# Patient Record
Sex: MALE | Race: WHITE | NOT HISPANIC OR LATINO | Employment: UNEMPLOYED | URBAN - METROPOLITAN AREA
[De-identification: names, ages, dates, MRNs, and addresses within clinical notes are randomized per-mention and may not be internally consistent; named-entity substitution may affect disease eponyms.]

---

## 2019-01-31 ENCOUNTER — TELEPHONE (OUTPATIENT)
Dept: SCHEDULING | Facility: IMAGING CENTER | Age: 34
End: 2019-01-31

## 2019-02-03 ENCOUNTER — OFFICE VISIT (OUTPATIENT)
Dept: URGENT CARE | Facility: PHYSICIAN GROUP | Age: 34
End: 2019-02-03
Payer: COMMERCIAL

## 2019-02-03 VITALS
DIASTOLIC BLOOD PRESSURE: 70 MMHG | HEART RATE: 76 BPM | TEMPERATURE: 99.4 F | RESPIRATION RATE: 16 BRPM | BODY MASS INDEX: 26.34 KG/M2 | SYSTOLIC BLOOD PRESSURE: 106 MMHG | WEIGHT: 184 LBS | HEIGHT: 70 IN | OXYGEN SATURATION: 99 %

## 2019-02-03 DIAGNOSIS — Z76.0 MEDICATION REFILL: ICD-10-CM

## 2019-02-03 DIAGNOSIS — F32.A DEPRESSION, UNSPECIFIED DEPRESSION TYPE: ICD-10-CM

## 2019-02-03 PROCEDURE — 99203 OFFICE O/P NEW LOW 30 MIN: CPT | Performed by: PHYSICIAN ASSISTANT

## 2019-02-03 RX ORDER — ESCITALOPRAM OXALATE 10 MG/1
10 TABLET ORAL DAILY
Qty: 30 TAB | Refills: 0 | Status: SHIPPED | OUTPATIENT
Start: 2019-02-03 | End: 2019-03-05

## 2019-02-03 RX ORDER — ESCITALOPRAM OXALATE 10 MG/1
10 TABLET ORAL DAILY
COMMUNITY
End: 2019-02-03 | Stop reason: SDUPTHER

## 2019-02-03 ASSESSMENT — ENCOUNTER SYMPTOMS
SENSORY CHANGE: 0
HALLUCINATIONS: 0
CHILLS: 0
HEADACHES: 0
COUGH: 0
PHOTOPHOBIA: 0
VOMITING: 0
INSOMNIA: 1
NAUSEA: 0
SHORTNESS OF BREATH: 0
EYE PAIN: 0
DEPRESSION: 1
DIZZINESS: 0
BLURRED VISION: 0
MEMORY LOSS: 0
LOSS OF CONSCIOUSNESS: 0
NECK PAIN: 0
FOCAL WEAKNESS: 0
SEIZURES: 0
FEVER: 0
DOUBLE VISION: 0
PALPITATIONS: 0
NERVOUS/ANXIOUS: 1

## 2019-02-03 ASSESSMENT — LIFESTYLE VARIABLES: SUBSTANCE_ABUSE: 0

## 2019-02-03 NOTE — PROGRESS NOTES
"Subjective:      Anil Kendall is a 33 y.o. male who presents with Medication Refill (refill on lexapro, need referral for behavorial health)            Depression   This is a chronic problem. The current episode started more than 1 year ago. The problem occurs constantly. Pertinent negatives include no chest pain, chills, coughing, fever, headaches, nausea, neck pain or vomiting. Treatments tried: SSRI. The treatment provided significant relief.       Review of Systems   Constitutional: Negative for chills and fever.   HENT: Negative for ear pain, hearing loss and tinnitus.    Eyes: Negative for blurred vision, double vision, photophobia and pain.   Respiratory: Negative for cough and shortness of breath.    Cardiovascular: Negative for chest pain and palpitations.   Gastrointestinal: Negative for nausea and vomiting.   Musculoskeletal: Negative for neck pain.   Neurological: Negative for dizziness, sensory change, focal weakness, seizures, loss of consciousness and headaches.   Psychiatric/Behavioral: Positive for depression. Negative for hallucinations, memory loss, substance abuse and suicidal ideas. The patient is nervous/anxious and has insomnia.    All other systems reviewed and are negative.    PMH:  has no past medical history on file.  MEDS:   Current Outpatient Prescriptions:   •  escitalopram (LEXAPRO) 10 MG Tab, Take 1 Tab by mouth every day for 30 days., Disp: 30 Tab, Rfl: 0  ALLERGIES:   Allergies   Allergen Reactions   • Augmentin Swelling     SURGHX: No past surgical history on file.  SOCHX:  reports that he has been smoking Cigarettes.  He has quit using smokeless tobacco.  FH: Family history was reviewed, no pertinent findings to report  Medications, Allergies, and current problem list reviewed today in Epic         Objective:     /70 (BP Location: Left arm, Patient Position: Sitting, BP Cuff Size: Adult)   Pulse 76   Temp 37.4 °C (99.4 °F) (Temporal)   Resp 16   Ht 1.778 m (5' 10\")   " Wt 83.5 kg (184 lb)   SpO2 99%   BMI 26.40 kg/m²      Physical Exam   Cardiovascular: Normal rate and regular rhythm.    Pulmonary/Chest: Effort normal and breath sounds normal.   Musculoskeletal: Normal range of motion.   Psychiatric: He has a normal mood and affect. His behavior is normal. Judgment and thought content normal.   Vitals reviewed.              Assessment/Plan:   Patient is a 33-year-old male who presents with symptoms of depression and anxiety.  He states that this is a ongoing problem and his treated with Lexapro.  He recently moved here from [place] and is trying to establish with primary care and behavioral health.  He denies any suicidal ideation or thoughts of harming others.    1. Depression, unspecified depression type    - escitalopram (LEXAPRO) 10 MG Tab; Take 1 Tab by mouth every day for 30 days.  Dispense: 30 Tab; Refill: 0  - REFERRAL TO BEHAVIORAL HEALTH    2. Medication refill    - escitalopram (LEXAPRO) 10 MG Tab; Take 1 Tab by mouth every day for 30 days.  Dispense: 30 Tab; Refill: 0  - REFERRAL TO BEHAVIORAL HEALTH    Differential diagnosis, natural history, supportive care discussed. Follow-up with primary care provider within 7-10 days, emergency room precautions discussed.  Patient and/or family appears understanding of information.  Handout and review of patients diagnosis and treatment was discussed extensively.

## 2019-02-03 NOTE — LETTER
February 3, 2019         Patient: Anil Kendall   YOB: 1985   Date of Visit: 2/3/2019           To Whom it May Concern:    Anil Kendall was seen in my clinic on 2/3/2019. Please excuse him from work 2/2-2/5/2019.  If you have any questions or concerns, please don't hesitate to call.        Sincerely,           Bill Vazquez P.A.-C.  Electronically Signed

## 2019-05-27 ENCOUNTER — OFFICE VISIT (OUTPATIENT)
Dept: URGENT CARE | Facility: PHYSICIAN GROUP | Age: 34
End: 2019-05-27
Payer: COMMERCIAL

## 2019-05-27 VITALS
RESPIRATION RATE: 12 BRPM | TEMPERATURE: 97 F | OXYGEN SATURATION: 98 % | HEIGHT: 70 IN | HEART RATE: 75 BPM | BODY MASS INDEX: 26.34 KG/M2 | DIASTOLIC BLOOD PRESSURE: 78 MMHG | WEIGHT: 184 LBS | SYSTOLIC BLOOD PRESSURE: 110 MMHG

## 2019-05-27 DIAGNOSIS — R05.9 COUGH: ICD-10-CM

## 2019-05-27 DIAGNOSIS — J01.00 ACUTE NON-RECURRENT MAXILLARY SINUSITIS: Primary | ICD-10-CM

## 2019-05-27 PROCEDURE — 99214 OFFICE O/P EST MOD 30 MIN: CPT | Performed by: PHYSICIAN ASSISTANT

## 2019-05-27 RX ORDER — ESCITALOPRAM OXALATE 10 MG/1
TABLET ORAL
Refills: 1 | COMMUNITY
Start: 2019-03-29 | End: 2020-03-30 | Stop reason: SDUPTHER

## 2019-05-27 RX ORDER — DOXYCYCLINE HYCLATE 100 MG
100 TABLET ORAL 2 TIMES DAILY
Qty: 20 TAB | Refills: 0 | Status: SHIPPED | OUTPATIENT
Start: 2019-05-27 | End: 2019-06-06

## 2019-05-27 NOTE — PROGRESS NOTES
Subjective:      Anil Kendall is a 33 y.o. male who presents with Nasal Congestion (Nasal congestion, nausea, diarrhea, difficultly sleeping, SOB, mild cough x6days )    PMH:  Reviewed with patient/family member/EPIC.   MEDS:   Current Outpatient Prescriptions:   •  escitalopram (LEXAPRO) 10 MG Tab, , Disp: , Rfl: 1  •  doxycycline (VIBRAMYCIN) 100 MG Tab, Take 1 Tab by mouth 2 times a day for 10 days., Disp: 20 Tab, Rfl: 0  ALLERGIES:   Allergies   Allergen Reactions   • Augmentin Swelling     SURGHX: No past surgical history on file.  SOCHX:  reports that he has been smoking Cigarettes.  He has quit using smokeless tobacco.  FH: Reviewed with patient, not pertinent to this visit.           Patient presents with:  Nasal Congestion: Nasal congestion, nausea, diarrhea from cough medicine, difficultly sleeping secondary to cough from post nasal drip, SOB, mild cough x 6 days.             Sinus Problem   This is a new problem. The current episode started in the past 7 days. The problem has been gradually worsening since onset. The maximum temperature recorded prior to his arrival was 100.4 - 100.9 F. The fever has been present for 1 to 2 days. The pain is moderate. Associated symptoms include chills, congestion, coughing, ear pain, headaches and sinus pressure. Pertinent negatives include no shortness of breath or sore throat. Past treatments include oral decongestants and saline sprays (cough medicine). The treatment provided no relief.       Review of Systems   Constitutional: Positive for chills. Negative for fever.   HENT: Positive for congestion, ear pain, sinus pain and sinus pressure. Negative for sore throat.    Respiratory: Positive for cough and sputum production. Negative for shortness of breath, wheezing and stridor.    Neurological: Positive for headaches.   All other systems reviewed and are negative.         Objective:     /78   Pulse 75   Temp 36.1 °C (97 °F) (Temporal)   Resp 12   Ht 1.778  "m (5' 10\")   Wt 83.5 kg (184 lb)   SpO2 98%   BMI 26.40 kg/m²      Physical Exam   Constitutional: He is oriented to person, place, and time. He appears well-developed and well-nourished. No distress.   HENT:   Head: Normocephalic and atraumatic.   Right Ear: Tympanic membrane normal.   Left Ear: Tympanic membrane normal.   Nose: Right sinus exhibits maxillary sinus tenderness. Left sinus exhibits maxillary sinus tenderness.   Mouth/Throat: Uvula is midline, oropharynx is clear and moist and mucous membranes are normal.   Eyes: Pupils are equal, round, and reactive to light. EOM are normal.   Neck: Normal range of motion. Neck supple.   Cardiovascular: Normal rate, regular rhythm and normal heart sounds.    Pulmonary/Chest: Effort normal and breath sounds normal. He has no wheezes. He has no rales.   Abdominal: Soft.   Musculoskeletal: Normal range of motion.   Neurological: He is alert and oriented to person, place, and time. Gait normal.   Skin: Skin is warm.   Psychiatric: He has a normal mood and affect.   Nursing note and vitals reviewed.              Assessment/Plan:     1. Acute non-recurrent maxillary sinusitis  doxycycline (VIBRAMYCIN) 100 MG Tab   2. Cough  doxycycline (VIBRAMYCIN) 100 MG Tab     PT can continue OTC medications, increase fluids and rest until symptoms improve.     PT should follow up with PCP in 1-2 days for re-evaluation if symptoms have not improved.  Discussed red flags and reasons to return to UC or ED.  Pt and/or family verbalized understanding of diagnosis and follow up instructions and was offered informational handout on diagnosis.  PT discharged.       "

## 2019-05-27 NOTE — LETTER
May 27, 2019         Patient: Anil Kendall   YOB: 1985   Date of Visit: 5/27/2019           To Whom it May Concern:    Anil Kendall was seen in my clinic on 5/27/2019 for an illness that began 5/22/2019. He may return to work on 5/28/2019.      If you have any questions or concerns, please don't hesitate to call.        Sincerely,           Skye Palma P.A.-C.  Electronically Signed

## 2019-05-29 ASSESSMENT — ENCOUNTER SYMPTOMS
SHORTNESS OF BREATH: 0
CHILLS: 1
COUGH: 1
SORE THROAT: 0
HEADACHES: 1
FEVER: 0
SINUS PRESSURE: 1
WHEEZING: 0
SPUTUM PRODUCTION: 1
STRIDOR: 0
SINUS PAIN: 1

## 2019-07-23 ENCOUNTER — OFFICE VISIT (OUTPATIENT)
Dept: URGENT CARE | Facility: PHYSICIAN GROUP | Age: 34
End: 2019-07-23
Payer: COMMERCIAL

## 2019-07-23 VITALS
DIASTOLIC BLOOD PRESSURE: 68 MMHG | HEART RATE: 67 BPM | RESPIRATION RATE: 14 BRPM | BODY MASS INDEX: 28.06 KG/M2 | OXYGEN SATURATION: 100 % | HEIGHT: 70 IN | SYSTOLIC BLOOD PRESSURE: 118 MMHG | WEIGHT: 196 LBS | TEMPERATURE: 98.3 F

## 2019-07-23 DIAGNOSIS — M72.2 PLANTAR FASCIITIS OF RIGHT FOOT: ICD-10-CM

## 2019-07-23 PROCEDURE — 99214 OFFICE O/P EST MOD 30 MIN: CPT | Performed by: PHYSICIAN ASSISTANT

## 2019-07-23 RX ORDER — PREDNISONE 20 MG/1
20 TABLET ORAL DAILY
Qty: 7 TAB | Refills: 0 | Status: SHIPPED | OUTPATIENT
Start: 2019-07-23 | End: 2019-07-30

## 2019-07-23 ASSESSMENT — ENCOUNTER SYMPTOMS
TINGLING: 1
MYALGIAS: 0
NUMBNESS: 1
JOINT SWELLING: 0
ARTHRALGIAS: 1

## 2019-07-23 NOTE — LETTER
July 23, 2019         Patient: Anil Kendall   YOB: 1985   Date of Visit: 7/23/2019           To Whom it May Concern:    Anil Kendall was seen in my clinic on 7/23/2019. He may return to work on 7/28/2019.     If you have any questions or concerns, please don't hesitate to call.        Sincerely,           Skye Palma P.A.-C.  Electronically Signed

## 2019-07-23 NOTE — PROGRESS NOTES
Subjective:      Anil Kendall is a 33 y.o. male who presents with Leg Pain (R ankle, bottom of foot, sharp pain shooting up to spine, twists to the right, numbness [id number])    PMH: Reviewed with patient/family member/EPIC.   MEDS:   Current Outpatient Prescriptions:   •  escitalopram (LEXAPRO) 10 MG Tab, , Disp: , Rfl: 1  ALLERGIES:   Allergies   Allergen Reactions   • Augmentin Swelling     SURGHX: History reviewed. No pertinent surgical history.  SOCHX:  reports that he has been smoking Cigarettes.  His smokeless tobacco use includes Chew.  FH: Reviewed with patient, not pertinent to this visit.           Patient presents with:    Patient presents with complaint of sharp pain on the bottom of his right foot at the arch of his foot that extends near his heel and sometimes up his leg.  Patient states pain is terrible when he gets out of bed but improves as he walks a little bit in the morning.  Patient works in a job where he is on his feet for 12 hours a day and has noticed that his foot pain is worse on the days he works in a little bit better on the days he is off.  Patient does wear supportive shoes at work.  Patient denies history of trauma, fall slip trip or twisting his ankle.  Patient has not taken any over-the-counter medications for his foot.  Patient also has not tried ice.        Foot Problem   This is a new problem. Episode onset: 3 weeks. The problem occurs constantly. The problem has been waxing and waning. Associated symptoms include arthralgias and numbness. Pertinent negatives include no joint swelling or myalgias. The symptoms are aggravated by standing and walking (Squatting). He has tried rest and position changes for the symptoms. The treatment provided no relief.       Review of Systems   Musculoskeletal: Positive for arthralgias. Negative for joint pain, joint swelling and myalgias.   Neurological: Positive for tingling (Sometimes toes tingle at the end of the day) and numbness.   All other  "systems reviewed and are negative.         Objective:     /68 (BP Location: Left arm, Patient Position: Sitting, BP Cuff Size: Adult)   Pulse 67   Temp 36.8 °C (98.3 °F) (Temporal)   Resp 14   Ht 1.778 m (5' 10\")   Wt 88.9 kg (196 lb)   SpO2 100%   BMI 28.12 kg/m²      Physical Exam   Constitutional: He is oriented to person, place, and time. He appears well-developed and well-nourished. No distress.   HENT:   Head: Normocephalic and atraumatic.   Nose: Nose normal.   Eyes: Pupils are equal, round, and reactive to light. Conjunctivae and EOM are normal.   Neck: Normal range of motion. Neck supple. No JVD present.   Cardiovascular: Normal rate and intact distal pulses.    Pulmonary/Chest: Effort normal.   Abdominal: Soft.   Musculoskeletal:        Right foot: There is tenderness. There is normal range of motion, no bony tenderness, no swelling and normal capillary refill.        Feet:    Patient has significant pain with cupping of the heel, palpation to bottom of foot especially the arch.  No particular swelling or redness.  Pt has FROM of ankle, toes and knee.  Pt ambulates with antalgic gait. Distal neuro/vascular intact.  Cap refill < 2 sec    Lymphadenopathy:     He has no cervical adenopathy.   Neurological: He is alert and oriented to person, place, and time.   Skin: Skin is warm and dry. Capillary refill takes less than 2 seconds.   Nursing note and vitals reviewed.              Assessment/Plan:     1. Plantar fasciitis of right foot  predniSONE (DELTASONE) 20 MG Tab   Will treat with course of oral steroids.   PT should consider new shoes as his boots today are a bit old, torn.      PT should avoid walking barefoot when he gets home.      RICE TREATMENT FOR EXTREMITY INJURIES:  R-rest the extremity as much as possible while pain and swelling persist  I-ice the extremity 15 minutes every 2 hours for the first 24 hours, then 4-5 times daily   C-compress the extremity either with splint or ace wrap " as directed  E-elevate the extremity to help with swelling    Motrin/Advil/Ibuprophen 600 mg every 6 hours as needed for pain or fever.    PT should follow up with Ortho or podiatry in 14-21 days for re-evaluation if symptoms have not improved.  Discussed red flags and reasons to return to UC or ED.  Pt and/or family verbalized understanding of diagnosis and follow up instructions and was offered informational handout on diagnosis.  PT discharged.

## 2019-07-23 NOTE — PATIENT INSTRUCTIONS
Plantar Fasciitis  Plantar fasciitis is a painful foot condition that affects the heel. It occurs when the band of tissue that connects the toes to the heel bone (plantar fascia) becomes irritated. This can happen after exercising too much or doing other repetitive activities (overuse injury). The pain from plantar fasciitis can range from mild irritation to severe pain that makes it difficult for you to walk or move. The pain is usually worse in the morning or after you have been sitting or lying down for a while.  CAUSES  This condition may be caused by:  · Standing for long periods of time.  · Wearing shoes that do not fit.  · Doing high-impact activities, including running, aerobics, and ballet.  · Being overweight.  · Having an abnormal way of walking (gait).  · Having tight calf muscles.  · Having high arches in your feet.  · Starting a new athletic activity.  SYMPTOMS  The main symptom of this condition is heel pain. Other symptoms include:  · Pain that gets worse after activity or exercise.  · Pain that is worse in the morning or after resting.  · Pain that goes away after you walk for a few minutes.  DIAGNOSIS  This condition may be diagnosed based on your signs and symptoms. Your health care provider will also do a physical exam to check for:  · A tender area on the bottom of your foot.  · A high arch in your foot.  · Pain when you move your foot.  · Difficulty moving your foot.  You may also need to have imaging studies to confirm the diagnosis. These can include:  · X-rays.  · Ultrasound.  · MRI.  TREATMENT   Treatment for plantar fasciitis depends on the severity of the condition. Your treatment may include:  · Rest, ice, and over-the-counter pain medicines to manage your pain.  · Exercises to stretch your calves and your plantar fascia.  · A splint that holds your foot in a stretched, upward position while you sleep (night splint).  · Physical therapy to relieve symptoms and prevent problems in the  future.  · Cortisone injections to relieve severe pain.  · Extracorporeal shock wave therapy (ESWT) to stimulate damaged plantar fascia with electrical impulses. It is often used as a last resort before surgery.  · Surgery, if other treatments have not worked after 12 months.  HOME CARE INSTRUCTIONS  · Take medicines only as directed by your health care provider.  · Avoid activities that cause pain.  · Roll the bottom of your foot over a bag of ice or a bottle of cold water. Do this for 20 minutes, 3-4 times a day.  · Perform simple stretches as directed by your health care provider.  · Try wearing athletic shoes with air-sole or gel-sole cushions or soft shoe inserts.  · Wear a night splint while sleeping, if directed by your health care provider.  · Keep all follow-up appointments with your health care provider.  PREVENTION   · Do not perform exercises or activities that cause heel pain.  · Consider finding low-impact activities if you continue to have problems.  · Lose weight if you need to.  The best way to prevent plantar fasciitis is to avoid the activities that aggravate your plantar fascia.  SEEK MEDICAL CARE IF:  · Your symptoms do not go away after treatment with home care measures.  · Your pain gets worse.  · Your pain affects your ability to move or do your daily activities.  This information is not intended to replace advice given to you by your health care provider. Make sure you discuss any questions you have with your health care provider.  Document Released: 09/12/2002 Document Revised: 04/10/2017 Document Reviewed: 10/28/2015  [employer] Patient Education © 2017 [employer]

## 2020-03-30 ENCOUNTER — OFFICE VISIT (OUTPATIENT)
Dept: URGENT CARE | Facility: PHYSICIAN GROUP | Age: 35
End: 2020-03-30

## 2020-03-30 ENCOUNTER — TELEPHONE (OUTPATIENT)
Dept: HEALTH INFORMATION MANAGEMENT | Facility: OTHER | Age: 35
End: 2020-03-30

## 2020-03-30 ENCOUNTER — HOSPITAL ENCOUNTER (OUTPATIENT)
Dept: RADIOLOGY | Facility: MEDICAL CENTER | Age: 35
End: 2020-03-30
Attending: FAMILY MEDICINE

## 2020-03-30 VITALS
BODY MASS INDEX: 26.92 KG/M2 | TEMPERATURE: 98.9 F | HEIGHT: 70 IN | HEART RATE: 74 BPM | DIASTOLIC BLOOD PRESSURE: 68 MMHG | RESPIRATION RATE: 16 BRPM | WEIGHT: 188 LBS | SYSTOLIC BLOOD PRESSURE: 128 MMHG | OXYGEN SATURATION: 99 %

## 2020-03-30 DIAGNOSIS — R10.84 GENERALIZED ABDOMINAL PAIN: ICD-10-CM

## 2020-03-30 DIAGNOSIS — R11.0 NAUSEA: ICD-10-CM

## 2020-03-30 DIAGNOSIS — F32.A DEPRESSION, UNSPECIFIED DEPRESSION TYPE: ICD-10-CM

## 2020-03-30 DIAGNOSIS — R19.4 RECENT CHANGE IN FREQUENCY OF BOWEL MOVEMENTS: ICD-10-CM

## 2020-03-30 LAB
APPEARANCE UR: YELLOW
BILIRUB UR STRIP-MCNC: NEGATIVE MG/DL
COLOR UR AUTO: CLEAR
GLUCOSE UR STRIP.AUTO-MCNC: NEGATIVE MG/DL
KETONES UR STRIP.AUTO-MCNC: NEGATIVE MG/DL
LEUKOCYTE ESTERASE UR QL STRIP.AUTO: NEGATIVE
NITRITE UR QL STRIP.AUTO: NEGATIVE
PH UR STRIP.AUTO: 6 [PH] (ref 5–8)
PROT UR QL STRIP: NEGATIVE MG/DL
RBC UR QL AUTO: NEGATIVE
SP GR UR STRIP.AUTO: 1.01
UROBILINOGEN UR STRIP-MCNC: NORMAL MG/DL

## 2020-03-30 PROCEDURE — 99204 OFFICE O/P NEW MOD 45 MIN: CPT | Performed by: FAMILY MEDICINE

## 2020-03-30 PROCEDURE — 74177 CT ABD & PELVIS W/CONTRAST: CPT

## 2020-03-30 PROCEDURE — 81002 URINALYSIS NONAUTO W/O SCOPE: CPT | Performed by: FAMILY MEDICINE

## 2020-03-30 PROCEDURE — 700117 HCHG RX CONTRAST REV CODE 255

## 2020-03-30 RX ORDER — ONDANSETRON 4 MG/1
4 TABLET, ORALLY DISINTEGRATING ORAL ONCE
Status: COMPLETED | OUTPATIENT
Start: 2020-03-30 | End: 2020-03-30

## 2020-03-30 RX ORDER — ESCITALOPRAM OXALATE 10 MG/1
10 TABLET ORAL DAILY
Qty: 30 TAB | Refills: 2 | Status: SHIPPED | OUTPATIENT
Start: 2020-03-30

## 2020-03-30 RX ADMIN — IOHEXOL 100 ML: 350 INJECTION, SOLUTION INTRAVENOUS at 12:30

## 2020-03-30 RX ADMIN — IOHEXOL 25 ML: 240 INJECTION, SOLUTION INTRATHECAL; INTRAVASCULAR; INTRAVENOUS; ORAL at 12:04

## 2020-03-30 RX ADMIN — ONDANSETRON 4 MG: 4 TABLET, ORALLY DISINTEGRATING ORAL at 10:34

## 2020-03-30 ASSESSMENT — ENCOUNTER SYMPTOMS
CHILLS: 0
BLOOD IN STOOL: 1
ABDOMINAL PAIN: 1
MYALGIAS: 0
DIZZINESS: 0
SORE THROAT: 0
COUGH: 0
FEVER: 0
DIARRHEA: 1
ANOREXIA: 0
EYE REDNESS: 0
NAUSEA: 1
VOMITING: 0
SHORTNESS OF BREATH: 0
HEMATOCHEZIA: 1

## 2020-03-30 NOTE — TELEPHONE ENCOUNTER
1. Caller Name: Anil Kendall                Call Back Number: [phone]  [facility] PCP or Specialty Provider: Yes           2.  Does patient have any active symptoms of respiratory illness (fever OR cough OR shortness of breath OR sore throat)? No.  C/o nausea, abd pain    3.  Does patient have any comoribidities? None     4.  Has the patient traveled in the last 14 days OR had any known contact with someone who is suspected or confirmed to have COVID-19?  No.    5. Disposition: Advised to perform self care, monitor for worsening symptoms and to call back in 3 days if no improvement    Note routed to [facility] Provider: CRISTINE only.

## 2020-03-30 NOTE — PROGRESS NOTES
Subjective:   Anil Kendall is a 34 y.o. male who presents for Diarrhea (headache, nausea, bloddy stool x 3 days needs work note) and Medication Refill (lexapro)        34-year-old male presents to the urgent care with chief complaint of generalized abdominal pain over the past 3 days.  Patient complains of the insidious onset of generalized abdominal pain worse on the right side over the past 3 days.  Patient states the abdominal pain is persistent and complains of abdominal pain today.  Patient states pain comes and goes and has been associated with nausea, denies vomiting, denies fevers chills or sweats.  Patient complains of 3 loose stools a day with intermittent blood in the stool.  Patient complains of history of chronic depression and has taken escitalopram 10 mg a day in the past.  The patient states he is not taking in the past 3 months because he is ran out of the medication.  Patient is requesting to restart the medication at the previously prescribed dose of 10 mg a day.  Patient denies suicidal ideation denies homicidal ideation denies hallucinations.  The patient states he is between primary care providers at this time.    Diarrhea    Associated symptoms include abdominal pain. Pertinent negatives include no chills, coughing, fever, myalgias or vomiting.   RLQ Pain   This is a new problem. Episode onset: 3 days. The onset quality is gradual. The problem occurs intermittently. The problem has been unchanged. The pain is located in the RLQ, RUQ and generalized abdominal region (Patient states generalized abdominal pain worse on the right side). The quality of the pain is aching and cramping. Associated symptoms include diarrhea, hematochezia (States intermittent bright red blood in stool, states 3 loose stools a day which is a change from baseline of 1 stool every other day) and nausea. Pertinent negatives include no anorexia, dysuria, fever, myalgias or vomiting. The pain is relieved by nothing. He has  "tried nothing for the symptoms.     PMH:  has no past medical history on file.  MEDS:   Current Outpatient Medications:   •  escitalopram (LEXAPRO) 10 MG Tab, Take 1 Tab by mouth every day., Disp: 30 Tab, Rfl: 2  ALLERGIES:   Allergies   Allergen Reactions   • Augmentin Swelling     SURGHX: History reviewed. No pertinent surgical history.  SOCHX:  reports that he has been smoking cigarettes. His smokeless tobacco use includes chew.  FH: Family history was reviewed  Review of Systems   Constitutional: Negative for chills and fever.   HENT: Negative for sore throat.    Eyes: Negative for redness.   Respiratory: Negative for cough and shortness of breath.    Cardiovascular: Negative for chest pain.   Gastrointestinal: Positive for abdominal pain, blood in stool (intermittent, resolved, dneies rectal bleeding today), diarrhea, hematochezia (States intermittent bright red blood in stool, states 3 loose stools a day which is a change from baseline of 1 stool every other day) and nausea. Negative for anorexia and vomiting.   Genitourinary: Negative for dysuria.   Musculoskeletal: Negative for myalgias.   Skin: Negative for rash.   Neurological: Negative for dizziness.        Objective:   /68 (BP Location: Left arm, Patient Position: Sitting, BP Cuff Size: Adult)   Pulse 74   Temp 37.2 °C (98.9 °F) (Temporal)   Resp 16   Ht 1.778 m (5' 10\")   Wt 85.3 kg (188 lb)   SpO2 99%   BMI 26.98 kg/m²   Physical Exam  Vitals signs and nursing note reviewed.   Constitutional:       General: He is not in acute distress.     Appearance: He is well-developed.   HENT:      Head: Normocephalic and atraumatic.      Right Ear: External ear normal.      Left Ear: External ear normal.      Nose: Nose normal.      Mouth/Throat:      Mouth: Mucous membranes are moist.      Pharynx: Oropharynx is clear.   Eyes:      Conjunctiva/sclera: Conjunctivae normal.      Pupils: Pupils are equal, round, and reactive to light.   Cardiovascular: "      Rate and Rhythm: Normal rate and regular rhythm.      Heart sounds: No murmur.   Pulmonary:      Effort: Pulmonary effort is normal. No respiratory distress.      Breath sounds: Normal breath sounds.   Abdominal:      General: There is no distension.      Palpations: Abdomen is soft.      Tenderness: There is abdominal tenderness in the right lower quadrant. There is guarding. There is no right CVA tenderness, left CVA tenderness or rebound. Negative signs include German's sign and McBurney's sign.   Musculoskeletal: Normal range of motion.   Skin:     General: Skin is warm and dry.   Neurological:      General: No focal deficit present.      Mental Status: He is alert and oriented to person, place, and time. Mental status is at baseline.      Gait: Gait (gait at baseline) normal.   Psychiatric:         Mood and Affect: Mood normal.         Judgment: Judgment normal.     Urinalysis: see entered results  Results for CLIFF ORELLANA (MRN [id number]) as of 3/30/2020 10:40   Ref. Range 3/30/2020 09:10   POC Color Latest Ref Range: Negative  clear   POC Appearance Latest Ref Range: Negative  yellow   POC Specific Gravity Latest Ref Range: <1.005 - >1.030  1.010   POC Urine PH Latest Ref Range: 5.0 - 8.0  6.0   POC Glucose Latest Ref Range: Negative mg/dL negative   POC Ketones Latest Ref Range: Negative mg/dL negative   POC Protein Latest Ref Range: Negative mg/dL negative   POC Nitrites Latest Ref Range: Negative  negative   POC Leukocyte Esterase Latest Ref Range: Negative  negative   POC Blood Latest Ref Range: Negative  negative   POC Bilirubin Latest Ref Range: Negative mg/dL negative   POC Urobiligen Latest Ref Range: Negative (0.2) mg/dL 0.2eu/dl     CT-ABDOMEN-PELVIS WITH   Order: [id number]   Status:  Final result   Visible to patient:  No (Not Released) Next appt:  None Dx:  Generalized abdominal pain   Details     Reading Physician Reading Date Result Priority   Tigist Lyon M.D.  [phone] 3/30/2020  Urgent Care      Narrative & Impression        3/30/2020 11:37 AM     HISTORY/REASON FOR EXAM:  Generalized abdominal pain for 5 days more so on the right lower quadrant..        TECHNIQUE/EXAM DESCRIPTION:  CT scan of the abdomen and pelvis with contrast.     Contrast-enhanced helical scanning was obtained from the diaphragmatic domes through the pubic symphysis following the bolus administration of 100 mL of Omnipaque 350 nonionic contrast without complication.     Low dose optimization technique was utilized for this CT exam including automated exposure control and adjustment of the mA and/or kV according to patient size.     COMPARISON: No prior studies available.     FINDINGS:  The visualized lung bases are clear.     There is no acute bony process.     CT Abdomen:     The liver is unremarkable.     The spleen is unremarkable.     The pancreas is unremarkable.     The gallbladder demonstrates no stones.     The adrenal glands are normal in size.     The kidneys enhance symmetrically. There is an incidental probable left renal cortical cyst of no clinical significance which does not need further imaging follow-up per ACR consensus guidelines 2019.     The abdominal aorta is normal in caliber.     There is no lymphadenopathy.     The bowel within the abdomen is unremarkable.     The appendix is normal.     CT Pelvis:  There is no acute inflammatory process in the pelvis.     There is no free fluid.     IMPRESSION:     1.  There is no acute inflammatory process within the abdomen or pelvis.             Last Resulted: 03/30/20 12:21 PM Order Details View Encounter Lab and Collection Details Routing                Medical decision-making/course: The patient remained afebrile, hemodynamically and neurologically stable with no evidence of respiratory compromise throughout the urgent care course.  The patient was given ondansetron 4 mg sublingual once and thereafter was able to tolerate oral fluid intake.  There was no  immediate clinical indication for the necessity of emergency department evaluation or inpatient admission and the patient requested a trial of outpatient management.          Assessment/Plan:   1. Generalized abdominal pain  - CT-ABDOMEN-PELVIS WITH; Future  - POCT Urinalysis    2. Nausea  - ondansetron (ZOFRAN ODT) dispertab 4 mg    3. Depression, unspecified depression type  - REFERRAL TO BEHAVIORAL HEALTH  - escitalopram (LEXAPRO) 10 MG Tab; Take 1 Tab by mouth every day.  Dispense: 30 Tab; Refill: 2    Advised symptomatic and supportive measures and specifically advised to maintain adequate fluid hydration.    Discussed close monitoring, return precautions, and supportive measures including maintaining adequate fluid hydration and caloric intake, relative rest and OTC symptom management including acetaminophen as needed for pain and/or fever.    Differential diagnosis, natural history, supportive care, and indications for immediate follow-up discussed.     Advised the patient to follow-up with the primary care physician for recheck, reevaluation, and consideration of further management.    Please note that this dictation was created using voice recognition software. I have worked with consultants from the vendor as well as technical experts from [employer] to optimize the interface. I have made every reasonable attempt to correct obvious errors, but I expect that there are errors of grammar and possibly content that I did not discover before finalizing the note.

## 2020-03-30 NOTE — LETTER
March 30, 2020         Patient: Anil Kendall   YOB: 1985   Date of Visit: 3/30/2020           To Whom it May Concern:    Anil Kendall was seen in my clinic on 3/30/2020. He may return to work 3 days after resolution of all symptoms and with no fever.    If you have any questions or concerns, please don't hesitate to call.        Sincerely,           Harry Alvarez M.D.  Electronically Signed

## 2021-03-13 ENCOUNTER — HOSPITAL ENCOUNTER (EMERGENCY)
Facility: MEDICAL CENTER | Age: 36
End: 2021-03-13
Attending: EMERGENCY MEDICINE

## 2021-03-13 VITALS
RESPIRATION RATE: 16 BRPM | WEIGHT: 180 LBS | HEART RATE: 76 BPM | TEMPERATURE: 97.2 F | SYSTOLIC BLOOD PRESSURE: 104 MMHG | OXYGEN SATURATION: 97 % | BODY MASS INDEX: 25.77 KG/M2 | DIASTOLIC BLOOD PRESSURE: 72 MMHG | HEIGHT: 70 IN

## 2021-03-13 DIAGNOSIS — R45.851 SUICIDAL IDEATION: ICD-10-CM

## 2021-03-13 DIAGNOSIS — F10.920 ALCOHOLIC INTOXICATION WITHOUT COMPLICATION (HCC): ICD-10-CM

## 2021-03-13 LAB
AMPHET UR QL SCN: NEGATIVE
BARBITURATES UR QL SCN: NEGATIVE
BENZODIAZ UR QL SCN: NEGATIVE
BZE UR QL SCN: NEGATIVE
CANNABINOIDS UR QL SCN: NEGATIVE
METHADONE UR QL SCN: NEGATIVE
OPIATES UR QL SCN: NEGATIVE
OXYCODONE UR QL SCN: NEGATIVE
PCP UR QL SCN: NEGATIVE
POC BREATHALIZER: 0.08 PERCENT (ref 0–0.01)
POC BREATHALIZER: 0.09 PERCENT (ref 0–0.01)
POC BREATHALIZER: 0.11 PERCENT (ref 0–0.01)
POC BREATHALIZER: 0.17 PERCENT (ref 0–0.01)
PROPOXYPH UR QL SCN: NEGATIVE

## 2021-03-13 PROCEDURE — 302970 POC BREATHALIZER

## 2021-03-13 PROCEDURE — 80307 DRUG TEST PRSMV CHEM ANLYZR: CPT

## 2021-03-13 PROCEDURE — 90791 PSYCH DIAGNOSTIC EVALUATION: CPT

## 2021-03-13 PROCEDURE — 302970 POC BREATHALIZER: Performed by: EMERGENCY MEDICINE

## 2021-03-13 PROCEDURE — 99284 EMERGENCY DEPT VISIT MOD MDM: CPT

## 2021-03-13 RX ORDER — ESCITALOPRAM OXALATE 10 MG/1
10 TABLET ORAL DAILY
Qty: 30 TABLET | Refills: 0 | Status: SHIPPED | OUTPATIENT
Start: 2021-03-13
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
## 2021-03-13 NOTE — ED TRIAGE NOTES
"Chief Complaint   Patient presents with   • Suicidal Ideation     Patient was found by [employer] by the [facility]. [employer] was called due to a \"suicidal subject\". Pts attempted to \"throw himself down the hill but it didn't work\". His new plan is to \"go in traffic\". Pt admits to drinking \"4 shots and 2 tall beers in the last 24 hours\". Pt denies any drug use. [employer] placed pt on legal hold PTA.      Patient was BIB [employer] for the above complaint. Pts belongings removed. Pt states he hasn't taken his lexapro \"in months\".  Pt cooperative with triage assessment. Legal hold placed in chart.   "

## 2021-03-13 NOTE — ED PROVIDER NOTES
"ED Provider Note    This patient was cared for during the COVID-19 pandemic. History and physical exam may be limited/truncated by the inherent challenges of PPE and the need to decrease staff exposure to novel coronavirus. Some aspects of disease management may be different to protect staff and help slow the spread of disease. I verified that, if possible, the patient was wearing a mask and I was wearing appropriate PPE every time I encountered the patient.       CHIEF COMPLAINT  Chief Complaint   Patient presents with   • Suicidal Ideation     Patient was found by [employer] by the [facility]. [employer] was called due to a \"suicidal subject\". Pts attempted to \"throw himself down the hill but it didn't work\". His new plan is to \"go in traffic\". Pt admits to drinking \"4 shots and 2 tall beers in the last 24 hours\". Pt denies any drug use. [employer] placed pt on legal hold PTA.        HPI  Anil Kendall Jr. is a 35 y.o. male who presents for evaluation of suicidal ideation.  He says he typically does not have suicidal thoughts unless he is drinking.  He says they tend to surface when he is drinking but are manageable when he has not.  He used to drink fairly often but is now only drinks every month month and a half.  However when he does drink he finds himself in trouble he says.  He used to be on Lexapro he has been off of it for the last 3 to 4 months and he typically does quite well when he is on this medication.  He does not have any primary care or seek outpatient resources at this time      REVIEW OF SYSTEMS  positive for suicidal ideation depression, negative for other illnesses. All other systems are negative.     PAST MEDICAL HISTORY       SOCIAL HISTORY  Social History     Tobacco Use   • Smoking status: Current Every Day Smoker     Types: Cigarettes   • Smokeless tobacco: Current User     Types: Chew   Substance and Sexual Activity   • Alcohol use: Not on file   • Drug use: Not on file   • Sexual activity: Not on file " "      SURGICAL HISTORY  patient denies any surgical history    CURRENT MEDICATIONS  Home Medications    **Home medications have not yet been reviewed for this encounter**         ALLERGIES  Allergies   Allergen Reactions   • Augmentin Swelling       PHYSICAL EXAM  VITAL SIGNS: /57   Pulse 91   Temp 36.2 °C (97.1 °F) (Temporal)   Resp 16   Ht 1.778 m (5' 10\")   Wt 81.6 kg (180 lb)   SpO2 94%   BMI 25.83 kg/m² .  Constitutional: Alert in no apparent distress.  HENT: No signs of trauma, Bilateral external ears normal, Nose normal.   Eyes: Pupils are equal and reactive, Conjunctiva normal, Non-icteric.   Neck: Normal range of motion, No tenderness, Supple, No stridor.   Cardiovascular: Regular rate and rhythm, no murmurs.   Thorax & Lungs: Normal breath sounds, No respiratory distress, No wheezing, No chest tenderness.   Abdomen: Bowel sounds normal, Soft, No tenderness, No masses, No peritoneal signs.  Skin: Warm, Dry, No erythema, No rash.   Musculoskeletal:  no major deformities noted.   Neurologic: Alert,  No focal deficits noted.   Psychiatric: Affect depressed but appropriate, Judgment normal, Mood normal.       DIAGNOSTIC STUDIES / PROCEDURES      EKG  No results found for this or any previous visit.      LABS  Labs Reviewed   POC BREATHALIZER - Abnormal; Notable for the following components:       Result Value    POC Breathalizer 0.172 (*)     All other components within normal limits   URINE DRUG SCREEN       RADIOLOGY  No orders to display       Medical records reviewed for continuity of care.  No recent emergency department visits noted.    Differential Diagnosis includes but is not limited to: Alcohol problems, depression    COURSE & MEDICAL DECISION MAKING  Pertinent Labs & Imaging studies reviewed. (See chart for details)    This is a 35-year-old gentleman that presents intoxicated with suicidal thoughts.  He used to be on Lexapro and apparently did quite well on it.  He is cooperative here.  " I do think he requires evaluation by life skills once he is sober.  He may be amenable to outpatient follow-up and restarting his antidepressants.  However further disposition will be determined once he is evaluated by life skills.  And he is signed out to Dr. Burgess pending this evaluation.        FINAL IMPRESSION  1. Suicidal ideation     2. Alcoholic intoxication without complication (HCC)         2.   3.    This dictation has been creating using voice recognition software. The accuracy of the dictation is limited the abilities of the software.  I expect there may be some errors of grammar and possibly content. I made every attempt to manually correct the errors within my dictation. However errors related to this voice recognition software may still exist and should be interpreted within the appropriate context.      The note accurately reflects work and decisions made by me.  Rosalina Hutchison M.D.  3/13/2021  2:23 PM

## 2021-03-13 NOTE — DISCHARGE PLANNING
Alert Team  Consult order noted.  Pt not currently ready for consult.  Pt will need legal sobriety noted in results tabs; currently intoxicated at 0.172 etoh.  WCTM for bedside RN to note sobriety.  LH initiated per [employer].    Pt will need PAR to complete his registration and UDS sent prior to consult.

## 2021-03-14 NOTE — ED PROVIDER NOTES
This is an addendum to the note on this patient.  For further details and full chart information, see the previously signed note.       5:52 PM Patient has a blood alcohol concentration of 0.09. He is feeling improved and requesting to get back on his Lexapro medication.  Is willing to contract for safety and will be released as his blood alcohol is now below 0.08.    The patient will return for new or worsening symptoms and is stable at the time of discharge.    The patient is referred to a primary physician for blood pressure management, diabetic screening, and for all other preventative health concerns.    In prescribing controlled substances to this patient, I certify that I have obtained and reviewed the medical history of Anil Kendall . I have also made a good rosalba effort to obtain applicable records from other providers who have treated the patient and records did not demonstrate any increased risk of substance abuse that would prevent me from prescribing controlled substances.     I have conducted a physical exam and documented it. I have reviewed Mr. Kendall’s prescription history as maintained by the [place] Prescription Monitoring Program.     I have assessed the patient’s risk for abuse, dependency, and addiction using the validated Opioid Risk Tool available at https://www.mdcalc.com/icshps-qadq-ogrv-ort-narcotic-abuse.     Given the above, I believe the benefits of controlled substance therapy outweigh the risks. The reasons for prescribing controlled substances include non-narcotic, oral analgesic alternatives have been inadequate for pain control. Accordingly, I have discussed the risk and benefits, treatment plan, and alternative therapies with the patient.        DISPOSITION:  Patient will be discharged home in stable condition.    FOLLOW UP:  No follow-up provider specified.    OUTPATIENT MEDICATIONS:  New Prescriptions    ESCITALOPRAM (LEXAPRO) 10 MG TAB    Take 1 tablet by mouth every  day.

## 2021-03-14 NOTE — ED NOTES
D/C home with written and verbal instructions re: Rx, activity, f/u.  Verbalizes understanding.  Ambulated out with steady gait. Denies HI/SI at this time. Understands about taking his prescriptions and following up with outside help. Patient A+Ox4. VSS

## 2023-05-26 NOTE — ED NOTES
Contacted patient's daughter and informed of MRI results as noted below. She verbally stated understanding. Had no questions or concerns at this time.   Med rec updated and complete. Allergies reviewed.  Pt is not currently taking any medications.      Home pharmacy [facility]